# Patient Record
Sex: FEMALE | Race: WHITE | URBAN - METROPOLITAN AREA
[De-identification: names, ages, dates, MRNs, and addresses within clinical notes are randomized per-mention and may not be internally consistent; named-entity substitution may affect disease eponyms.]

---

## 2017-02-23 ENCOUNTER — OUTPATIENT (OUTPATIENT)
Dept: OUTPATIENT SERVICES | Facility: HOSPITAL | Age: 82
LOS: 1 days | Discharge: HOME | End: 2017-02-23

## 2017-06-27 DIAGNOSIS — E03.9 HYPOTHYROIDISM, UNSPECIFIED: ICD-10-CM

## 2017-06-27 DIAGNOSIS — E78.5 HYPERLIPIDEMIA, UNSPECIFIED: ICD-10-CM

## 2017-06-27 DIAGNOSIS — N18.3 CHRONIC KIDNEY DISEASE, STAGE 3 (MODERATE): ICD-10-CM

## 2017-06-27 DIAGNOSIS — R73.01 IMPAIRED FASTING GLUCOSE: ICD-10-CM

## 2017-06-27 DIAGNOSIS — I10 ESSENTIAL (PRIMARY) HYPERTENSION: ICD-10-CM

## 2017-08-21 ENCOUNTER — OUTPATIENT (OUTPATIENT)
Dept: OUTPATIENT SERVICES | Facility: HOSPITAL | Age: 82
LOS: 1 days | Discharge: HOME | End: 2017-08-21

## 2017-08-21 DIAGNOSIS — I25.10 ATHEROSCLEROTIC HEART DISEASE OF NATIVE CORONARY ARTERY WITHOUT ANGINA PECTORIS: ICD-10-CM

## 2017-08-21 DIAGNOSIS — K21.9 GASTRO-ESOPHAGEAL REFLUX DISEASE WITHOUT ESOPHAGITIS: ICD-10-CM

## 2017-08-21 DIAGNOSIS — R73.03 PREDIABETES: ICD-10-CM

## 2017-08-21 DIAGNOSIS — Z98.61 CORONARY ANGIOPLASTY STATUS: ICD-10-CM

## 2017-08-21 DIAGNOSIS — E78.5 HYPERLIPIDEMIA, UNSPECIFIED: ICD-10-CM

## 2017-08-21 DIAGNOSIS — I10 ESSENTIAL (PRIMARY) HYPERTENSION: ICD-10-CM

## 2017-08-21 DIAGNOSIS — E03.9 HYPOTHYROIDISM, UNSPECIFIED: ICD-10-CM

## 2018-02-22 ENCOUNTER — OUTPATIENT (OUTPATIENT)
Dept: OUTPATIENT SERVICES | Facility: HOSPITAL | Age: 83
LOS: 1 days | Discharge: HOME | End: 2018-02-22

## 2018-02-22 DIAGNOSIS — N18.3 CHRONIC KIDNEY DISEASE, STAGE 3 (MODERATE): ICD-10-CM

## 2018-02-22 DIAGNOSIS — R73.01 IMPAIRED FASTING GLUCOSE: ICD-10-CM

## 2018-02-22 DIAGNOSIS — E78.5 HYPERLIPIDEMIA, UNSPECIFIED: ICD-10-CM

## 2018-02-22 DIAGNOSIS — I10 ESSENTIAL (PRIMARY) HYPERTENSION: ICD-10-CM

## 2018-02-22 DIAGNOSIS — M81.0 AGE-RELATED OSTEOPOROSIS WITHOUT CURRENT PATHOLOGICAL FRACTURE: ICD-10-CM

## 2018-02-22 DIAGNOSIS — E03.9 HYPOTHYROIDISM, UNSPECIFIED: ICD-10-CM

## 2018-06-21 ENCOUNTER — TRANSCRIPTION ENCOUNTER (OUTPATIENT)
Age: 83
End: 2018-06-21

## 2018-07-17 ENCOUNTER — TRANSCRIPTION ENCOUNTER (OUTPATIENT)
Age: 83
End: 2018-07-17

## 2018-08-20 ENCOUNTER — OUTPATIENT (OUTPATIENT)
Dept: OUTPATIENT SERVICES | Facility: HOSPITAL | Age: 83
LOS: 1 days | Discharge: HOME | End: 2018-08-20

## 2018-08-20 DIAGNOSIS — R73.01 IMPAIRED FASTING GLUCOSE: ICD-10-CM

## 2018-08-20 DIAGNOSIS — N18.3 CHRONIC KIDNEY DISEASE, STAGE 3 (MODERATE): ICD-10-CM

## 2018-08-20 DIAGNOSIS — I10 ESSENTIAL (PRIMARY) HYPERTENSION: ICD-10-CM

## 2018-08-20 DIAGNOSIS — E03.9 HYPOTHYROIDISM, UNSPECIFIED: ICD-10-CM

## 2018-08-20 DIAGNOSIS — E78.5 HYPERLIPIDEMIA, UNSPECIFIED: ICD-10-CM

## 2018-10-30 ENCOUNTER — OUTPATIENT (OUTPATIENT)
Dept: OUTPATIENT SERVICES | Facility: HOSPITAL | Age: 83
LOS: 1 days | Discharge: HOME | End: 2018-10-30

## 2018-10-30 DIAGNOSIS — R53.1 WEAKNESS: ICD-10-CM

## 2019-02-08 ENCOUNTER — TRANSCRIPTION ENCOUNTER (OUTPATIENT)
Age: 84
End: 2019-02-08

## 2019-02-25 ENCOUNTER — OUTPATIENT (OUTPATIENT)
Dept: OUTPATIENT SERVICES | Facility: HOSPITAL | Age: 84
LOS: 1 days | Discharge: HOME | End: 2019-02-25

## 2019-02-25 DIAGNOSIS — B82.9 INTESTINAL PARASITISM, UNSPECIFIED: ICD-10-CM

## 2019-02-25 DIAGNOSIS — A04.72 ENTEROCOLITIS DUE TO CLOSTRIDIUM DIFFICILE, NOT SPECIFIED AS RECURRENT: ICD-10-CM

## 2019-03-19 ENCOUNTER — OUTPATIENT (OUTPATIENT)
Dept: OUTPATIENT SERVICES | Facility: HOSPITAL | Age: 84
LOS: 1 days | Discharge: HOME | End: 2019-03-19

## 2019-03-19 DIAGNOSIS — R79.82 ELEVATED C-REACTIVE PROTEIN (CRP): ICD-10-CM

## 2019-03-19 DIAGNOSIS — R68.89 OTHER GENERAL SYMPTOMS AND SIGNS: ICD-10-CM

## 2019-03-19 DIAGNOSIS — R79.89 OTHER SPECIFIED ABNORMAL FINDINGS OF BLOOD CHEMISTRY: ICD-10-CM

## 2019-03-19 DIAGNOSIS — K51.918 ULCERATIVE COLITIS, UNSPECIFIED WITH OTHER COMPLICATION: ICD-10-CM

## 2019-04-25 ENCOUNTER — OUTPATIENT (OUTPATIENT)
Dept: OUTPATIENT SERVICES | Facility: HOSPITAL | Age: 84
LOS: 1 days | Discharge: HOME | End: 2019-04-25

## 2019-04-25 DIAGNOSIS — K21.9 GASTRO-ESOPHAGEAL REFLUX DISEASE WITHOUT ESOPHAGITIS: ICD-10-CM

## 2019-04-25 DIAGNOSIS — I10 ESSENTIAL (PRIMARY) HYPERTENSION: ICD-10-CM

## 2019-04-25 DIAGNOSIS — E55.9 VITAMIN D DEFICIENCY, UNSPECIFIED: ICD-10-CM

## 2019-04-25 DIAGNOSIS — R15.9 FULL INCONTINENCE OF FECES: ICD-10-CM

## 2019-04-25 DIAGNOSIS — E03.9 HYPOTHYROIDISM, UNSPECIFIED: ICD-10-CM

## 2019-04-25 DIAGNOSIS — N18.3 CHRONIC KIDNEY DISEASE, STAGE 3 (MODERATE): ICD-10-CM

## 2019-04-25 DIAGNOSIS — E78.5 HYPERLIPIDEMIA, UNSPECIFIED: ICD-10-CM

## 2019-04-25 DIAGNOSIS — R31.29 OTHER MICROSCOPIC HEMATURIA: ICD-10-CM

## 2019-04-25 DIAGNOSIS — J18.9 PNEUMONIA, UNSPECIFIED ORGANISM: ICD-10-CM

## 2019-04-25 DIAGNOSIS — M81.0 AGE-RELATED OSTEOPOROSIS WITHOUT CURRENT PATHOLOGICAL FRACTURE: ICD-10-CM

## 2019-04-25 DIAGNOSIS — I25.10 ATHEROSCLEROTIC HEART DISEASE OF NATIVE CORONARY ARTERY WITHOUT ANGINA PECTORIS: ICD-10-CM

## 2019-04-25 DIAGNOSIS — I65.23 OCCLUSION AND STENOSIS OF BILATERAL CAROTID ARTERIES: ICD-10-CM

## 2019-04-25 DIAGNOSIS — R73.01 IMPAIRED FASTING GLUCOSE: ICD-10-CM

## 2019-05-03 ENCOUNTER — OUTPATIENT (OUTPATIENT)
Dept: OUTPATIENT SERVICES | Facility: HOSPITAL | Age: 84
LOS: 1 days | Discharge: HOME | End: 2019-05-03

## 2019-05-03 DIAGNOSIS — I50.9 HEART FAILURE, UNSPECIFIED: ICD-10-CM

## 2019-05-03 DIAGNOSIS — I48.91 UNSPECIFIED ATRIAL FIBRILLATION: ICD-10-CM

## 2019-05-09 ENCOUNTER — OUTPATIENT (OUTPATIENT)
Dept: OUTPATIENT SERVICES | Facility: HOSPITAL | Age: 84
LOS: 1 days | Discharge: HOME | End: 2019-05-09

## 2019-05-09 DIAGNOSIS — D64.9 ANEMIA, UNSPECIFIED: ICD-10-CM

## 2019-05-13 ENCOUNTER — OUTPATIENT (OUTPATIENT)
Dept: OUTPATIENT SERVICES | Facility: HOSPITAL | Age: 84
LOS: 1 days | Discharge: HOME | End: 2019-05-13
Payer: MEDICARE

## 2019-05-13 DIAGNOSIS — R10.30 LOWER ABDOMINAL PAIN, UNSPECIFIED: ICD-10-CM

## 2019-05-13 PROCEDURE — 74177 CT ABD & PELVIS W/CONTRAST: CPT | Mod: 26

## 2019-09-04 ENCOUNTER — OUTPATIENT (OUTPATIENT)
Dept: OUTPATIENT SERVICES | Facility: HOSPITAL | Age: 84
LOS: 1 days | Discharge: HOME | End: 2019-09-04

## 2019-09-04 DIAGNOSIS — K62.1 RECTAL POLYP: ICD-10-CM

## 2019-12-12 ENCOUNTER — TRANSCRIPTION ENCOUNTER (OUTPATIENT)
Age: 84
End: 2019-12-12

## 2020-08-15 ENCOUNTER — TRANSCRIPTION ENCOUNTER (OUTPATIENT)
Age: 85
End: 2020-08-15

## 2021-04-19 ENCOUNTER — OUTPATIENT (OUTPATIENT)
Dept: OUTPATIENT SERVICES | Facility: HOSPITAL | Age: 86
LOS: 1 days | Discharge: HOME | End: 2021-04-19

## 2021-04-19 ENCOUNTER — OUTPATIENT (OUTPATIENT)
Dept: OUTPATIENT SERVICES | Facility: HOSPITAL | Age: 86
LOS: 1 days | Discharge: HOME | End: 2021-04-19
Payer: MEDICARE

## 2021-04-19 DIAGNOSIS — R91.8 OTHER NONSPECIFIC ABNORMAL FINDING OF LUNG FIELD: ICD-10-CM

## 2021-04-19 DIAGNOSIS — Z11.59 ENCOUNTER FOR SCREENING FOR OTHER VIRAL DISEASES: ICD-10-CM

## 2021-04-19 PROCEDURE — 71046 X-RAY EXAM CHEST 2 VIEWS: CPT | Mod: 26

## 2021-04-22 ENCOUNTER — OUTPATIENT (OUTPATIENT)
Dept: OUTPATIENT SERVICES | Facility: HOSPITAL | Age: 86
LOS: 1 days | Discharge: HOME | End: 2021-04-22
Payer: MEDICARE

## 2021-04-22 DIAGNOSIS — R07.9 CHEST PAIN, UNSPECIFIED: ICD-10-CM

## 2021-04-22 PROCEDURE — 93018 CV STRESS TEST I&R ONLY: CPT

## 2021-04-22 PROCEDURE — 93016 CV STRESS TEST SUPVJ ONLY: CPT

## 2021-04-22 PROCEDURE — 93306 TTE W/DOPPLER COMPLETE: CPT | Mod: 26

## 2021-04-22 PROCEDURE — G1004: CPT

## 2021-04-22 PROCEDURE — 78452 HT MUSCLE IMAGE SPECT MULT: CPT | Mod: 26,ME

## 2021-04-22 RX ORDER — ADENOSINE 3 MG/ML
60 INJECTION INTRAVENOUS ONCE
Refills: 0 | Status: DISCONTINUED | OUTPATIENT
Start: 2021-04-22 | End: 2021-05-06

## 2023-06-25 ENCOUNTER — INPATIENT (INPATIENT)
Facility: HOSPITAL | Age: 88
LOS: 1 days | Discharge: HOME | DRG: 184 | End: 2023-06-27
Attending: SURGERY | Admitting: SURGERY
Payer: MEDICARE

## 2023-06-25 VITALS
OXYGEN SATURATION: 100 % | TEMPERATURE: 99 F | RESPIRATION RATE: 18 BRPM | WEIGHT: 149.91 LBS | HEART RATE: 77 BPM | SYSTOLIC BLOOD PRESSURE: 149 MMHG | DIASTOLIC BLOOD PRESSURE: 78 MMHG

## 2023-06-25 DIAGNOSIS — S22.42XA MULTIPLE FRACTURES OF RIBS, LEFT SIDE, INITIAL ENCOUNTER FOR CLOSED FRACTURE: ICD-10-CM

## 2023-06-25 LAB
ALBUMIN SERPL ELPH-MCNC: 4.6 G/DL — SIGNIFICANT CHANGE UP (ref 3.5–5.2)
ALP SERPL-CCNC: 70 U/L — SIGNIFICANT CHANGE UP (ref 30–115)
ALT FLD-CCNC: 22 U/L — SIGNIFICANT CHANGE UP (ref 0–41)
ANION GAP SERPL CALC-SCNC: 14 MMOL/L — SIGNIFICANT CHANGE UP (ref 7–14)
ANION GAP SERPL CALC-SCNC: 16 MMOL/L — HIGH (ref 7–14)
APTT BLD: 28.1 SEC — SIGNIFICANT CHANGE UP (ref 27–39.2)
AST SERPL-CCNC: 25 U/L — SIGNIFICANT CHANGE UP (ref 0–41)
BASOPHILS # BLD AUTO: 0.02 K/UL — SIGNIFICANT CHANGE UP (ref 0–0.2)
BASOPHILS # BLD AUTO: 0.02 K/UL — SIGNIFICANT CHANGE UP (ref 0–0.2)
BASOPHILS NFR BLD AUTO: 0.2 % — SIGNIFICANT CHANGE UP (ref 0–1)
BASOPHILS NFR BLD AUTO: 0.3 % — SIGNIFICANT CHANGE UP (ref 0–1)
BILIRUB SERPL-MCNC: 0.8 MG/DL — SIGNIFICANT CHANGE UP (ref 0.2–1.2)
BUN SERPL-MCNC: 19 MG/DL — SIGNIFICANT CHANGE UP (ref 10–20)
BUN SERPL-MCNC: 21 MG/DL — HIGH (ref 10–20)
CALCIUM SERPL-MCNC: 9.2 MG/DL — SIGNIFICANT CHANGE UP (ref 8.4–10.5)
CALCIUM SERPL-MCNC: 9.7 MG/DL — SIGNIFICANT CHANGE UP (ref 8.4–10.5)
CHLORIDE SERPL-SCNC: 104 MMOL/L — SIGNIFICANT CHANGE UP (ref 98–110)
CHLORIDE SERPL-SCNC: 105 MMOL/L — SIGNIFICANT CHANGE UP (ref 98–110)
CO2 SERPL-SCNC: 19 MMOL/L — SIGNIFICANT CHANGE UP (ref 17–32)
CO2 SERPL-SCNC: 23 MMOL/L — SIGNIFICANT CHANGE UP (ref 17–32)
CREAT SERPL-MCNC: 1 MG/DL — SIGNIFICANT CHANGE UP (ref 0.7–1.5)
CREAT SERPL-MCNC: 1.1 MG/DL — SIGNIFICANT CHANGE UP (ref 0.7–1.5)
EGFR: 48 ML/MIN/1.73M2 — LOW
EGFR: 54 ML/MIN/1.73M2 — LOW
EOSINOPHIL # BLD AUTO: 0.03 K/UL — SIGNIFICANT CHANGE UP (ref 0–0.7)
EOSINOPHIL # BLD AUTO: 0.17 K/UL — SIGNIFICANT CHANGE UP (ref 0–0.7)
EOSINOPHIL NFR BLD AUTO: 0.3 % — SIGNIFICANT CHANGE UP (ref 0–8)
EOSINOPHIL NFR BLD AUTO: 2.7 % — SIGNIFICANT CHANGE UP (ref 0–8)
GLUCOSE SERPL-MCNC: 115 MG/DL — HIGH (ref 70–99)
GLUCOSE SERPL-MCNC: 121 MG/DL — HIGH (ref 70–99)
HCT VFR BLD CALC: 37.6 % — SIGNIFICANT CHANGE UP (ref 37–47)
HCT VFR BLD CALC: 43.5 % — SIGNIFICANT CHANGE UP (ref 37–47)
HGB BLD-MCNC: 12.5 G/DL — SIGNIFICANT CHANGE UP (ref 12–16)
HGB BLD-MCNC: 13.9 G/DL — SIGNIFICANT CHANGE UP (ref 12–16)
IMM GRANULOCYTES NFR BLD AUTO: 0.3 % — SIGNIFICANT CHANGE UP (ref 0.1–0.3)
IMM GRANULOCYTES NFR BLD AUTO: 0.6 % — HIGH (ref 0.1–0.3)
INR BLD: 0.91 RATIO — SIGNIFICANT CHANGE UP (ref 0.65–1.3)
LACTATE SERPL-SCNC: 2 MMOL/L — SIGNIFICANT CHANGE UP (ref 0.7–2)
LIDOCAIN IGE QN: 49 U/L — SIGNIFICANT CHANGE UP (ref 7–60)
LYMPHOCYTES # BLD AUTO: 1.16 K/UL — LOW (ref 1.2–3.4)
LYMPHOCYTES # BLD AUTO: 1.39 K/UL — SIGNIFICANT CHANGE UP (ref 1.2–3.4)
LYMPHOCYTES # BLD AUTO: 15.3 % — LOW (ref 20.5–51.1)
LYMPHOCYTES # BLD AUTO: 18.4 % — LOW (ref 20.5–51.1)
MAGNESIUM SERPL-MCNC: 1.8 MG/DL — SIGNIFICANT CHANGE UP (ref 1.8–2.4)
MCHC RBC-ENTMCNC: 29.3 PG — SIGNIFICANT CHANGE UP (ref 27–31)
MCHC RBC-ENTMCNC: 29.9 PG — SIGNIFICANT CHANGE UP (ref 27–31)
MCHC RBC-ENTMCNC: 32 G/DL — SIGNIFICANT CHANGE UP (ref 32–37)
MCHC RBC-ENTMCNC: 33.2 G/DL — SIGNIFICANT CHANGE UP (ref 32–37)
MCV RBC AUTO: 90 FL — SIGNIFICANT CHANGE UP (ref 81–99)
MCV RBC AUTO: 91.6 FL — SIGNIFICANT CHANGE UP (ref 81–99)
MONOCYTES # BLD AUTO: 0.45 K/UL — SIGNIFICANT CHANGE UP (ref 0.1–0.6)
MONOCYTES # BLD AUTO: 0.76 K/UL — HIGH (ref 0.1–0.6)
MONOCYTES NFR BLD AUTO: 7.2 % — SIGNIFICANT CHANGE UP (ref 1.7–9.3)
MONOCYTES NFR BLD AUTO: 8.4 % — SIGNIFICANT CHANGE UP (ref 1.7–9.3)
NEUTROPHILS # BLD AUTO: 4.45 K/UL — SIGNIFICANT CHANGE UP (ref 1.4–6.5)
NEUTROPHILS # BLD AUTO: 6.87 K/UL — HIGH (ref 1.4–6.5)
NEUTROPHILS NFR BLD AUTO: 70.8 % — SIGNIFICANT CHANGE UP (ref 42.2–75.2)
NEUTROPHILS NFR BLD AUTO: 75.5 % — HIGH (ref 42.2–75.2)
NRBC # BLD: 0 /100 WBCS — SIGNIFICANT CHANGE UP (ref 0–0)
NRBC # BLD: 0 /100 WBCS — SIGNIFICANT CHANGE UP (ref 0–0)
PHOSPHATE SERPL-MCNC: 4 MG/DL — SIGNIFICANT CHANGE UP (ref 2.1–4.9)
PLATELET # BLD AUTO: 218 K/UL — SIGNIFICANT CHANGE UP (ref 130–400)
PLATELET # BLD AUTO: 259 K/UL — SIGNIFICANT CHANGE UP (ref 130–400)
PMV BLD: 10 FL — SIGNIFICANT CHANGE UP (ref 7.4–10.4)
PMV BLD: 9.9 FL — SIGNIFICANT CHANGE UP (ref 7.4–10.4)
POTASSIUM SERPL-MCNC: 4.7 MMOL/L — SIGNIFICANT CHANGE UP (ref 3.5–5)
POTASSIUM SERPL-MCNC: 4.9 MMOL/L — SIGNIFICANT CHANGE UP (ref 3.5–5)
POTASSIUM SERPL-SCNC: 4.7 MMOL/L — SIGNIFICANT CHANGE UP (ref 3.5–5)
POTASSIUM SERPL-SCNC: 4.9 MMOL/L — SIGNIFICANT CHANGE UP (ref 3.5–5)
PROT SERPL-MCNC: 7.5 G/DL — SIGNIFICANT CHANGE UP (ref 6–8)
PROTHROM AB SERPL-ACNC: 10.3 SEC — SIGNIFICANT CHANGE UP (ref 9.95–12.87)
RBC # BLD: 4.18 M/UL — LOW (ref 4.2–5.4)
RBC # BLD: 4.75 M/UL — SIGNIFICANT CHANGE UP (ref 4.2–5.4)
RBC # FLD: 15.3 % — HIGH (ref 11.5–14.5)
RBC # FLD: 15.4 % — HIGH (ref 11.5–14.5)
SODIUM SERPL-SCNC: 139 MMOL/L — SIGNIFICANT CHANGE UP (ref 135–146)
SODIUM SERPL-SCNC: 142 MMOL/L — SIGNIFICANT CHANGE UP (ref 135–146)
WBC # BLD: 6.29 K/UL — SIGNIFICANT CHANGE UP (ref 4.8–10.8)
WBC # BLD: 9.1 K/UL — SIGNIFICANT CHANGE UP (ref 4.8–10.8)
WBC # FLD AUTO: 6.29 K/UL — SIGNIFICANT CHANGE UP (ref 4.8–10.8)
WBC # FLD AUTO: 9.1 K/UL — SIGNIFICANT CHANGE UP (ref 4.8–10.8)

## 2023-06-25 PROCEDURE — 73560 X-RAY EXAM OF KNEE 1 OR 2: CPT | Mod: 26,LT

## 2023-06-25 PROCEDURE — 99285 EMERGENCY DEPT VISIT HI MDM: CPT

## 2023-06-25 PROCEDURE — 97116 GAIT TRAINING THERAPY: CPT | Mod: GP

## 2023-06-25 PROCEDURE — 84100 ASSAY OF PHOSPHORUS: CPT

## 2023-06-25 PROCEDURE — 97110 THERAPEUTIC EXERCISES: CPT | Mod: GP

## 2023-06-25 PROCEDURE — 73030 X-RAY EXAM OF SHOULDER: CPT | Mod: 26,LT

## 2023-06-25 PROCEDURE — 36415 COLL VENOUS BLD VENIPUNCTURE: CPT

## 2023-06-25 PROCEDURE — 81003 URINALYSIS AUTO W/O SCOPE: CPT

## 2023-06-25 PROCEDURE — 74177 CT ABD & PELVIS W/CONTRAST: CPT | Mod: 26,MA

## 2023-06-25 PROCEDURE — 71260 CT THORAX DX C+: CPT | Mod: 26,MA

## 2023-06-25 PROCEDURE — 70450 CT HEAD/BRAIN W/O DYE: CPT | Mod: 26,MA

## 2023-06-25 PROCEDURE — 71045 X-RAY EXAM CHEST 1 VIEW: CPT | Mod: 26

## 2023-06-25 PROCEDURE — 85025 COMPLETE CBC W/AUTO DIFF WBC: CPT

## 2023-06-25 PROCEDURE — 99223 1ST HOSP IP/OBS HIGH 75: CPT

## 2023-06-25 PROCEDURE — 73070 X-RAY EXAM OF ELBOW: CPT | Mod: 26,LT

## 2023-06-25 PROCEDURE — 72170 X-RAY EXAM OF PELVIS: CPT | Mod: 26

## 2023-06-25 PROCEDURE — 83735 ASSAY OF MAGNESIUM: CPT

## 2023-06-25 PROCEDURE — 80048 BASIC METABOLIC PNL TOTAL CA: CPT

## 2023-06-25 PROCEDURE — 72125 CT NECK SPINE W/O DYE: CPT | Mod: 26,MA

## 2023-06-25 PROCEDURE — 97162 PT EVAL MOD COMPLEX 30 MIN: CPT | Mod: GP

## 2023-06-25 PROCEDURE — 71045 X-RAY EXAM CHEST 1 VIEW: CPT

## 2023-06-25 PROCEDURE — 73060 X-RAY EXAM OF HUMERUS: CPT | Mod: 26,LT

## 2023-06-25 RX ORDER — HYDROMORPHONE HYDROCHLORIDE 2 MG/ML
0.25 INJECTION INTRAMUSCULAR; INTRAVENOUS; SUBCUTANEOUS EVERY 4 HOURS
Refills: 0 | Status: DISCONTINUED | OUTPATIENT
Start: 2023-06-25 | End: 2023-06-27

## 2023-06-25 RX ORDER — SENNA PLUS 8.6 MG/1
2 TABLET ORAL AT BEDTIME
Refills: 0 | Status: DISCONTINUED | OUTPATIENT
Start: 2023-06-25 | End: 2023-06-27

## 2023-06-25 RX ORDER — METOPROLOL TARTRATE 50 MG
50 TABLET ORAL
Refills: 0 | Status: DISCONTINUED | OUTPATIENT
Start: 2023-06-25 | End: 2023-06-27

## 2023-06-25 RX ORDER — AMLODIPINE BESYLATE 2.5 MG/1
1 TABLET ORAL
Refills: 0 | DISCHARGE

## 2023-06-25 RX ORDER — MORPHINE SULFATE 50 MG/1
4 CAPSULE, EXTENDED RELEASE ORAL ONCE
Refills: 0 | Status: DISCONTINUED | OUTPATIENT
Start: 2023-06-25 | End: 2023-06-25

## 2023-06-25 RX ORDER — ACETAMINOPHEN 500 MG
650 TABLET ORAL EVERY 6 HOURS
Refills: 0 | Status: DISCONTINUED | OUTPATIENT
Start: 2023-06-25 | End: 2023-06-27

## 2023-06-25 RX ORDER — HEPARIN SODIUM 5000 [USP'U]/ML
5000 INJECTION INTRAVENOUS; SUBCUTANEOUS EVERY 8 HOURS
Refills: 0 | Status: DISCONTINUED | OUTPATIENT
Start: 2023-06-25 | End: 2023-06-27

## 2023-06-25 RX ORDER — LEVOTHYROXINE SODIUM 125 MCG
100 TABLET ORAL DAILY
Refills: 0 | Status: DISCONTINUED | OUTPATIENT
Start: 2023-06-25 | End: 2023-06-27

## 2023-06-25 RX ORDER — ASPIRIN/CALCIUM CARB/MAGNESIUM 324 MG
81 TABLET ORAL DAILY
Refills: 0 | Status: DISCONTINUED | OUTPATIENT
Start: 2023-06-25 | End: 2023-06-27

## 2023-06-25 RX ORDER — OMEPRAZOLE 10 MG/1
1 CAPSULE, DELAYED RELEASE ORAL
Refills: 0 | DISCHARGE

## 2023-06-25 RX ORDER — SIMVASTATIN 20 MG/1
20 TABLET, FILM COATED ORAL AT BEDTIME
Refills: 0 | Status: DISCONTINUED | OUTPATIENT
Start: 2023-06-25 | End: 2023-06-27

## 2023-06-25 RX ORDER — SIMVASTATIN 20 MG/1
1 TABLET, FILM COATED ORAL
Refills: 0 | DISCHARGE

## 2023-06-25 RX ORDER — METOPROLOL TARTRATE 50 MG
1 TABLET ORAL
Refills: 0 | DISCHARGE

## 2023-06-25 RX ORDER — LIDOCAINE 4 G/100G
1 CREAM TOPICAL ONCE
Refills: 0 | Status: COMPLETED | OUTPATIENT
Start: 2023-06-25 | End: 2023-06-25

## 2023-06-25 RX ORDER — AMLODIPINE BESYLATE 2.5 MG/1
10 TABLET ORAL ONCE
Refills: 0 | Status: COMPLETED | OUTPATIENT
Start: 2023-06-25 | End: 2023-06-25

## 2023-06-25 RX ORDER — LATANOPROST 0.05 MG/ML
1 SOLUTION/ DROPS OPHTHALMIC; TOPICAL AT BEDTIME
Refills: 0 | Status: DISCONTINUED | OUTPATIENT
Start: 2023-06-25 | End: 2023-06-27

## 2023-06-25 RX ORDER — OXYCODONE HYDROCHLORIDE 5 MG/1
5 TABLET ORAL EVERY 6 HOURS
Refills: 0 | Status: DISCONTINUED | OUTPATIENT
Start: 2023-06-25 | End: 2023-06-27

## 2023-06-25 RX ORDER — TIMOLOL 0.5 %
1 DROPS OPHTHALMIC (EYE) ONCE
Refills: 0 | Status: COMPLETED | OUTPATIENT
Start: 2023-06-25 | End: 2023-06-25

## 2023-06-25 RX ORDER — ASPIRIN/CALCIUM CARB/MAGNESIUM 324 MG
1 TABLET ORAL
Refills: 0 | DISCHARGE

## 2023-06-25 RX ORDER — ACETAMINOPHEN 500 MG
650 TABLET ORAL ONCE
Refills: 0 | Status: COMPLETED | OUTPATIENT
Start: 2023-06-25 | End: 2023-06-25

## 2023-06-25 RX ORDER — TIMOLOL 0.5 %
1 DROPS OPHTHALMIC (EYE)
Refills: 0 | DISCHARGE

## 2023-06-25 RX ORDER — LATANOPROST 0.05 MG/ML
1 SOLUTION/ DROPS OPHTHALMIC; TOPICAL
Refills: 0 | DISCHARGE

## 2023-06-25 RX ORDER — GABAPENTIN 400 MG/1
100 CAPSULE ORAL ONCE
Refills: 0 | Status: COMPLETED | OUTPATIENT
Start: 2023-06-25 | End: 2023-06-25

## 2023-06-25 RX ORDER — PANTOPRAZOLE SODIUM 20 MG/1
40 TABLET, DELAYED RELEASE ORAL
Refills: 0 | Status: DISCONTINUED | OUTPATIENT
Start: 2023-06-25 | End: 2023-06-27

## 2023-06-25 RX ORDER — MORPHINE SULFATE 50 MG/1
2 CAPSULE, EXTENDED RELEASE ORAL ONCE
Refills: 0 | Status: DISCONTINUED | OUTPATIENT
Start: 2023-06-25 | End: 2023-06-25

## 2023-06-25 RX ORDER — LEVOTHYROXINE SODIUM 125 MCG
1 TABLET ORAL
Refills: 0 | DISCHARGE

## 2023-06-25 RX ADMIN — LATANOPROST 1 DROP(S): 0.05 SOLUTION/ DROPS OPHTHALMIC; TOPICAL at 16:40

## 2023-06-25 RX ADMIN — Medication 1 DROP(S): at 16:41

## 2023-06-25 RX ADMIN — MORPHINE SULFATE 4 MILLIGRAM(S): 50 CAPSULE, EXTENDED RELEASE ORAL at 14:52

## 2023-06-25 RX ADMIN — SENNA PLUS 2 TABLET(S): 8.6 TABLET ORAL at 16:41

## 2023-06-25 RX ADMIN — GABAPENTIN 100 MILLIGRAM(S): 400 CAPSULE ORAL at 16:40

## 2023-06-25 RX ADMIN — LIDOCAINE 1 PATCH: 4 CREAM TOPICAL at 16:39

## 2023-06-25 RX ADMIN — Medication 1 TABLET(S): at 16:41

## 2023-06-25 RX ADMIN — Medication 650 MILLIGRAM(S): at 13:20

## 2023-06-25 RX ADMIN — HYDROMORPHONE HYDROCHLORIDE 0.25 MILLIGRAM(S): 2 INJECTION INTRAMUSCULAR; INTRAVENOUS; SUBCUTANEOUS at 21:50

## 2023-06-25 RX ADMIN — AMLODIPINE BESYLATE 10 MILLIGRAM(S): 2.5 TABLET ORAL at 16:41

## 2023-06-25 RX ADMIN — SIMVASTATIN 20 MILLIGRAM(S): 20 TABLET, FILM COATED ORAL at 16:42

## 2023-06-25 RX ADMIN — HYDROMORPHONE HYDROCHLORIDE 0.25 MILLIGRAM(S): 2 INJECTION INTRAMUSCULAR; INTRAVENOUS; SUBCUTANEOUS at 21:22

## 2023-06-25 RX ADMIN — MORPHINE SULFATE 2 MILLIGRAM(S): 50 CAPSULE, EXTENDED RELEASE ORAL at 12:44

## 2023-06-25 RX ADMIN — HEPARIN SODIUM 5000 UNIT(S): 5000 INJECTION INTRAVENOUS; SUBCUTANEOUS at 21:22

## 2023-06-25 RX ADMIN — Medication 81 MILLIGRAM(S): at 16:41

## 2023-06-25 RX ADMIN — Medication 50 MILLIGRAM(S): at 19:02

## 2023-06-25 RX ADMIN — HYDROMORPHONE HYDROCHLORIDE 0.25 MILLIGRAM(S): 2 INJECTION INTRAMUSCULAR; INTRAVENOUS; SUBCUTANEOUS at 17:16

## 2023-06-25 RX ADMIN — HEPARIN SODIUM 5000 UNIT(S): 5000 INJECTION INTRAVENOUS; SUBCUTANEOUS at 16:38

## 2023-06-25 NOTE — CONSULT NOTE ADULT - ASSESSMENT
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 she was seen in acute care in the ED and Trauma consult was called. Patient notes she was walking outside and tripped over a crack in the concrete. She fell striking the left side of her body, chest, left face, and left knee. She had no LOC. She notes pain in left arm and shoulder with decreased mobility of that extremity. Patient without additional complaints. Patient had imaging revealing of  Left Humeral neck fracture along with fracture of left 3rd, 4th, 5th, 6ht, 7th, 8th ribs.     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 she was seen in acute care in the ED and Trauma consult was called. Patient notes she was walking outside and tripped over a crack in the concrete. She fell striking the left side of her body, chest, left face, and left knee. She had no LOC. She notes pain in left arm and shoulder with decreased mobility of that extremity. Patient without additional complaints. Patient had imaging revealing of  Left Humeral neck fracture along with fracture of left 3rd, 4th, 5th, 6ht, 7th, 8th ribs.     S/P Fall with multiple rib fractures of left chest and left Humeral neck fracture  - Admit to SDU  - IS use  - Multi-modal pain control  - PT/OT/Hospitalist consult

## 2023-06-25 NOTE — H&P ADULT - ATTENDING COMMENTS
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 she was seen in acute care in the ED and Trauma consult was called. Patient notes she was walking outside and tripped over a crack in the concrete. She fell striking the left side of her body, chest, left face, and left knee. She had no LOC. She notes pain in left arm and shoulder with decreased mobility of that extremity.     Primary and secondary surveys were performed.    AAO x3  GCS 15.  Neuro intact.  Neck: no step-offs  Chest: decreased breath sounds in bilateral lung bases, L>R  CV : rrr  Abdomen: soft, nontender  Extr: Left shoulder pain to palpation and motion    All images and labs were reviewed.    ASSESSMENT:  88 y/o female, S/P Fall.  Closed Head Injury.  Closed Left Proximal Humerus Fracture.  Closed Left 3-8th Rib Fractures.  Acute pain due to trauma.  At risk for respiratory complications.    PLAN:  - pain control  - continuous O2sat and hemodynamic monitoring  - incentive spirometer  - follow CXR in am  - keep normotensive  - DVT prophylaxis  Admit to SDU.    This note reflects my exam and care of this patient on 06/25/2023

## 2023-06-25 NOTE — H&P ADULT - ASSESSMENT
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 she was seen in acute care in the ED and Trauma consult was called. Patient notes she was walking outside and tripped over a crack in the concrete. She fell striking the left side of her body, chest, left face, and left knee. She had no LOC. She notes pain in left arm and shoulder with decreased mobility of that extremity. Patient without additional complaints. Patient had imaging revealing of  Left Humeral neck fracture along with fracture of left 3rd, 4th, 5th, 6ht, 7th, 8th ribs.     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 she was seen in acute care in the ED and Trauma consult was called. Patient notes she was walking outside and tripped over a crack in the concrete. She fell striking the left side of her body, chest, left face, and left knee. She had no LOC. She notes pain in left arm and shoulder with decreased mobility of that extremity. Patient without additional complaints. Patient had imaging revealing of  Left Humeral neck fracture along with fracture of left 3rd, 4th, 5th, 6ht, 7th, 8th ribs.     S/P Fall with multiple rib fractures of left chest and left Humeral neck fracture  - Incentive Spirometer use encouraged   - Pain control, Tylenol Ibuprofen/ Oxycontin for pain  - Localized Lidocaine patches  - Orthopedics Consult    - PT/OT/Hospitalist consult  - Continue home medications     Follow up PCP following discharge for incidental findings of b/l pulmonary nodules and adrenal nodule    Discussed with Dr. Bliss, patient, family and ED provider

## 2023-06-25 NOTE — H&P ADULT - NSHPPHYSICALEXAM_GEN_ALL_CORE
Vital Signs Last 24 Hrs  T(C): 37.1 (25 Jun 2023 09:34), Max: 37.1 (25 Jun 2023 09:34)  T(F): 98.7 (25 Jun 2023 09:34), Max: 98.7 (25 Jun 2023 09:34)  HR: 77 (25 Jun 2023 09:34) (77 - 77)  BP: 149/78 (25 Jun 2023 09:34) (149/78 - 149/78)  BP(mean): --  RR: 18 (25 Jun 2023 09:34) (18 - 18)  SpO2: 100% (25 Jun 2023 09:34) (100% - 100%)    Parameters below as of 25 Jun 2023 09:34  Patient On (Oxygen Delivery Method): room air    Physical Exam  Gen: NAD  Head: NC/AT, no visible deformity  ENT: PERRLA, Sclera  , PERRLA Mucosal Membranes  Cardio: S1/S2 No S3/S4, Regular  Resp: CTA B/L  Abdomen: Soft, ND/NT  Neuro: AAOx3   Extremities: FROM x 4  Skin: No jaundice, no excoriation

## 2023-06-25 NOTE — H&P ADULT - NSHPLABSRESULTS_GEN_ALL_CORE
13.9   6.29  )-----------( 259      ( 25 Jun 2023 10:41 )             43.5       Auto Neutrophil %: 70.8 % (06-25-23 @ 10:41)  Auto Immature Granulocyte %: 0.6 % (06-25-23 @ 10:41)    06-25    142  |  105  |  21<H>  ----------------------------<  115<H>  4.7   |  23  |  1.1      Calcium: 9.7 mg/dL (06-25-23 @ 10:41)      LFTs:             7.5  | 0.8  | 25       ------------------[70      ( 25 Jun 2023 10:41 )  4.6  | x    | 22          Lipase:49       Lactate, Blood: 2.0 mmol/L (06-25-23 @ 10:41)      Coags:     10.30  ----< 0.91    ( 25 Jun 2023 10:41 )     28.1        Urinalysis Basic - ( 25 Jun 2023 10:41 )  Color: x / Appearance: x / SG: x / pH: x  Gluc: 115 mg/dL / Ketone: x  / Bili: x / Urobili: x   Blood: x / Protein: x / Nitrite: x   Leuk Esterase: x / RBC: x / WBC x   Sq Epi: x / Non Sq Epi: x / Bacteria: x      RADIOLOGY & ADDITIONAL STUDIES:  Xray Pelvis AP only 06.25.23   IMPRESSION:    Bones are osteopenic. No definite acute displaced fracture or   dislocation. Lumbosacral spine and bilateral hip changes. Excreted   contrast in the urinary bladder.    Xray Elbow AP + Lateral, Left 06.25.23  IMPRESSION:    Proximal left humerus fracture which is impacted and fracture line   appears to be extending obliquely from the surgical neck superiorly   toward the anatomical neck    < from: CT Abdomen and Pelvis w/ IV Cont (06.25.23 @ 11:39) >  IMPRESSION:    Acute comminuted, impacted and angulated LEFT humeral neck fracture. No   evidence of glenohumeral joint dislocation.    Acute minimally displaced LEFT 3rd, 4th, 5th, 6th and 7th  and 8th rib   fractures. No pneumothorax.    Scattered bilateral pulmonary nodules up to 1.1 cm,    Follow-up noncontrast chest CT is recommended when the patient's current   situation improves.    Slight nodularity of the hepatic contour. This may be within normal   limits and is also seen in disease processes such as cirrhosis and   clinical correlation is recommended.    Stable indeterminate right adrenal gland 2.9 cm nodule.    Indeterminate left thyroid nodule measuring up to 3.7 cm. Follow-up with   outpatient ultrasound is recommended ifindicated      Additional attending comments:    Scattered bilateral groundglass and reticular opacities seen throughout   both lung fields    Increase in size nodule opacities in the right middle lobe.    Follow-up noncontrast chest CT is recommended when the patient's current   situation improves.

## 2023-06-25 NOTE — CONSULT NOTE ADULT - SUBJECTIVE AND OBJECTIVE BOX
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 she was seen in acute care in the ED and Trauma consult was called. Patient notes she was walking outside and tripped over a crack in the concrete. She fell striking the left side of her body, chest, left face, and left knee. She had no LOC. She notes pain in left arm and shoulder with decreased mobility of that extremity. Patient without additional complaints.       Allergies  Valium (Rash)  penicillin (Flushing; Rash)  sulfa drugs (Rash)        Review of Systems  General:  Denies Fatigue, Denies Fever, Denies Weakness ,Denies Weight Loss   HEENT: Denies Trouble Swallowing ,Denies  Sore Throat , Denies Change in hearing/vision/speech ,Denies Dizziness    Cardio: Denies  Chest Pain , Palpitations    Respiratory: Denies worsening of SOB, Denies Cough  Abdomen: Denies Abdominal pain, nausea, emesis   Neuro: Denies Headache Denies Dizziness, Denies Paresthesias  MSK: See HPI  Psych: Patient denies depression, denies suicidal or homicidal ideations  Integ: Patient Denies rash, or new skin lesions       Vital Signs Last 24 Hrs  T(C): 37.1 (25 Jun 2023 09:34), Max: 37.1 (25 Jun 2023 09:34)  T(F): 98.7 (25 Jun 2023 09:34), Max: 98.7 (25 Jun 2023 09:34)  HR: 77 (25 Jun 2023 09:34) (77 - 77)  BP: 149/78 (25 Jun 2023 09:34) (149/78 - 149/78)  BP(mean): --  RR: 18 (25 Jun 2023 09:34) (18 - 18)  SpO2: 100% (25 Jun 2023 09:34) (100% - 100%)    Parameters below as of 25 Jun 2023 09:34  Patient On (Oxygen Delivery Method): room air    Physical Exam  Gen: NAD  Head: NC/AT, no visible deformity  ENT: PERRLA, Sclera  , PERRLA Mucosal Membranes  Cardio: S1/S2 No S3/S4, Regular  Resp: CTA B/L  Abdomen: Soft, ND/NT  Neuro: AAOx3   Extremities: FROM x 4  Skin: No jaundice, no excoriation     Labs:                          13.9   6.29  )-----------( 259      ( 25 Jun 2023 10:41 )             43.5       Auto Neutrophil %: 70.8 % (06-25-23 @ 10:41)  Auto Immature Granulocyte %: 0.6 % (06-25-23 @ 10:41)    06-25    142  |  105  |  21<H>  ----------------------------<  115<H>  4.7   |  23  |  1.1      Calcium: 9.7 mg/dL (06-25-23 @ 10:41)      LFTs:             7.5  | 0.8  | 25       ------------------[70      ( 25 Jun 2023 10:41 )  4.6  | x    | 22          Lipase:49       Lactate, Blood: 2.0 mmol/L (06-25-23 @ 10:41)      Coags:     10.30  ----< 0.91    ( 25 Jun 2023 10:41 )     28.1        Urinalysis Basic - ( 25 Jun 2023 10:41 )  Color: x / Appearance: x / SG: x / pH: x  Gluc: 115 mg/dL / Ketone: x  / Bili: x / Urobili: x   Blood: x / Protein: x / Nitrite: x   Leuk Esterase: x / RBC: x / WBC x   Sq Epi: x / Non Sq Epi: x / Bacteria: x      RADIOLOGY & ADDITIONAL STUDIES:  Xray Pelvis AP only 06.25.23   IMPRESSION:    Bones are osteopenic. No definite acute displaced fracture or   dislocation. Lumbosacral spine and bilateral hip changes. Excreted   contrast in the urinary bladder.    Xray Elbow AP + Lateral, Left 06.25.23  IMPRESSION:    Proximal left humerus fracture which is impacted and fracture line   appears to be extending obliquely from the surgical neck superiorly   toward the anatomical neck    < from: CT Abdomen and Pelvis w/ IV Cont (06.25.23 @ 11:39) >  IMPRESSION:    Acute comminuted, impacted and angulated LEFT humeral neck fracture. No   evidence of glenohumeral joint dislocation.    Acute minimally displaced LEFT 3rd, 4th, 5th, 6th and 7th  and 8th rib   fractures. No pneumothorax.    Scattered bilateral pulmonary nodules up to 1.1 cm,    Follow-up noncontrast chest CT is recommended when the patient's current   situation improves.    Slight nodularity of the hepatic contour. This may be within normal   limits and is also seen in disease processes such as cirrhosis and   clinical correlation is recommended.    Stable indeterminate right adrenal gland 2.9 cm nodule.    Indeterminate left thyroid nodule measuring up to 3.7 cm. Follow-up with   outpatient ultrasound is recommended ifindicated      Additional attending comments:    Scattered bilateral groundglass and reticular opacities seen throughout   both lung fields    Increase in size nodule opacities in the right middle lobe.    Follow-up noncontrast chest CT is recommended when the patient's current   situation improves.

## 2023-06-25 NOTE — ED PROVIDER NOTE - PHYSICAL EXAMINATION
Vital Signs: I have reviewed the initial vital signs.  Constitutional: Well-nourished, appears younger than stated age, in no acute distress.  HEENT: Airway patent, moist MM, no erythema/swelling/deformity of oral structures. EOMI, PERRLA.  CV: Regular rate, regular rhythm, well-perfused extremities, 2+ pulses bilaterally. DP/PT and radial pulses equal.  Lungs: CTAB, no increased WOB.  ABD: NTND, no guarding or rebound, no pulsatile masses, no hernias, no flank pain.   MSK: Neck supple, nontender, normal ROM, no stepoff. Chest nontender. Back nontender in TLS spine or to bilateral bony structures. +left shoulder soreness without obvious dislocation or defomrity, no overlying skin changes, ROM limited 2/2 pain. No elbow, forearm, or wrist/hand pain. +superficial abrasion to anterior left knee. Extremities otherwise nontender, normal ROM, no deformities.   INTEG: Skin warm, dry, +left knee abrasion  NEURO: A&Ox3, moving all extremities, normal speech.   PSYCH: Calm, cooperative, normal affect and interaction.

## 2023-06-25 NOTE — H&P ADULT - NSHPREVIEWOFSYSTEMS_GEN_ALL_CORE
Review of Systems  General:  Denies Fatigue, Denies Fever, Denies Weakness ,Denies Weight Loss   HEENT: Denies Trouble Swallowing ,Denies  Sore Throat , Denies Change in hearing/vision/speech ,Denies Dizziness    Cardio: Denies  Chest Pain , Palpitations    Respiratory: Denies worsening of SOB, Denies Cough  Abdomen: Denies Abdominal pain, nausea, emesis   Neuro: Denies Headache Denies Dizziness, Denies Paresthesias  MSK: See HPI  Psych: Patient denies depression, denies suicidal or homicidal ideations  Integ: Patient Denies rash, or new skin lesions

## 2023-06-25 NOTE — ED ADULT TRIAGE NOTE - CHIEF COMPLAINT QUOTE
Patient had single mechanical fall this morning, presents c/o left shoulder pain and left flank pain . Patient denies LOC and blood thinners but did bump head on ground. No obvious deformities noted. As per EMS patient was ambulatory at the scene with assistance

## 2023-06-25 NOTE — ED PROVIDER NOTE - PROGRESS NOTE DETAILS
TJY: trauma currently recommending orthopedics consult. Pt d/w orthopedics, recommending sling, will assess patient. TJY: trauma consulted regarding rib fractures.

## 2023-06-25 NOTE — ED PROVIDER NOTE - OBJECTIVE STATEMENT
88yo female PMHx CAD s/p stents (only ASA 81mg daily), HTN, HLD, hypothyroid, and GERD presenting s/p a trip on sidewalk and falling forward about 1 hour PTA. Pt "grazed" the front of her face on the sidewalk, no LOC. +c/o pain to left shoulder and "soreness" to her left ribs without difficulty breathing. +"soreness" to her left knee wihtout difficulty ambulating. No precipitating or subsequent dizziness/lightheadedness/syncope, numbness/weakness, cp/sob, or n/v, and has otherwise been in normal state of health.

## 2023-06-25 NOTE — ED PROVIDER NOTE - CLINICAL SUMMARY MEDICAL DECISION MAKING FREE TEXT BOX
89-year-old woman, history of CAD status post stents on aspirin, hypertension, hyperlipidemia, hypothyroid, GERD, complains of left shoulder and left chest wall pain status post mechanical fall PTA.  Grazed head on the sidewalk, but no LOC.  Vital signs, exam as noted, patient is very well-appearing, however there is significant tenderness to the left shoulder and left chest wall.  Labs okay, imaging reveals comminuted fracture of the left humerus as well as 6 rib fractures.  Trauma consulted and will admit to stepdown on their service, Ortho aware.  Patient and family understand and are amenable to plan.

## 2023-06-25 NOTE — ED PROVIDER NOTE - CARE PLAN
1 Principal Discharge DX:	Multiple fractures of ribs of left side  Secondary Diagnosis:	Humeral fracture  Secondary Diagnosis:	Fall

## 2023-06-25 NOTE — ED ADULT NURSE NOTE - OBJECTIVE STATEMENT
Pt presents to the ED s/p [place]. [place]. No LOC. No AC use. Pt states she hit the side of her cheek in the fall. She is having pain in the L arm and shoulder.

## 2023-06-25 NOTE — CONSULT NOTE ADULT - SUBJECTIVE AND OBJECTIVE BOX
Orthopedic Consult Note:    HPI:  89 RHD female presents to [facility] ED s/p mechanical fall ground level height. Denies head strike LOC.   Patient full ambulator, independent living. Patient reports taking ASA 81 daily s/p stent placement    PMH: HTN, HLD, HYPOT  PSH: Kidney donor  Allergies: Denies    PE:  Awake, alert  NAD  Breathing on RA    LUE  Skin intact, jewelry and bracelet removed from LUE  ROM limited due to pain   TTP proximal humerus  No TTP humeral shaft, elbow, forearm, hand wrist  Compartments soft and compressible, no pain passive stretch digits  NVI Distally  Cap refill < 2 sec    Imaging:  CT Chest / L shoulder XR demonstrate L proximal humerus fracture  Chest XR- Multiple Rib fractures    AP:  89F L proximal humerus fracture  -Sling placement with encouragement ROM elbow, wrist, hand, fingers  -NWB RUE  -No acute orthopedic intervention at this time  -PT/OT  -Pain control  -Home medications  -VTE prophylaxis per primary  -SCD  -ICS machine - setting of Rib fx  -Patient to follow-up with Dr. Walker [address] [facility]. Please call to make appointment upon discharge

## 2023-06-25 NOTE — H&P ADULT - HISTORY OF PRESENT ILLNESS
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 she was seen in acute care in the ED and Trauma consult was called. Patient notes she was walking outside and tripped over a crack in the concrete. She fell striking the left side of her body, chest, left face, and left knee. She had no LOC. She notes pain in left arm and shoulder with decreased mobility of that extremity. Patient without additional complaints.

## 2023-06-25 NOTE — ED ADULT NURSE NOTE - NSFALLHARMRISKINTERV_ED_ALL_ED

## 2023-06-26 LAB
APPEARANCE UR: CLEAR — SIGNIFICANT CHANGE UP
BASOPHILS # BLD AUTO: 0.02 K/UL — SIGNIFICANT CHANGE UP (ref 0–0.2)
BASOPHILS NFR BLD AUTO: 0.2 % — SIGNIFICANT CHANGE UP (ref 0–1)
BILIRUB UR-MCNC: NEGATIVE — SIGNIFICANT CHANGE UP
COLOR SPEC: YELLOW — SIGNIFICANT CHANGE UP
DIFF PNL FLD: NEGATIVE — SIGNIFICANT CHANGE UP
EOSINOPHIL # BLD AUTO: 0.25 K/UL — SIGNIFICANT CHANGE UP (ref 0–0.7)
EOSINOPHIL NFR BLD AUTO: 3 % — SIGNIFICANT CHANGE UP (ref 0–8)
GLUCOSE UR QL: NEGATIVE — SIGNIFICANT CHANGE UP
HCT VFR BLD CALC: 34.3 % — LOW (ref 37–47)
HGB BLD-MCNC: 11.1 G/DL — LOW (ref 12–16)
IMM GRANULOCYTES NFR BLD AUTO: 0.2 % — SIGNIFICANT CHANGE UP (ref 0.1–0.3)
KETONES UR-MCNC: NEGATIVE — SIGNIFICANT CHANGE UP
LEUKOCYTE ESTERASE UR-ACNC: NEGATIVE — SIGNIFICANT CHANGE UP
LYMPHOCYTES # BLD AUTO: 1.83 K/UL — SIGNIFICANT CHANGE UP (ref 1.2–3.4)
LYMPHOCYTES # BLD AUTO: 22.1 % — SIGNIFICANT CHANGE UP (ref 20.5–51.1)
MCHC RBC-ENTMCNC: 29.7 PG — SIGNIFICANT CHANGE UP (ref 27–31)
MCHC RBC-ENTMCNC: 32.4 G/DL — SIGNIFICANT CHANGE UP (ref 32–37)
MCV RBC AUTO: 91.7 FL — SIGNIFICANT CHANGE UP (ref 81–99)
MONOCYTES # BLD AUTO: 0.81 K/UL — HIGH (ref 0.1–0.6)
MONOCYTES NFR BLD AUTO: 9.8 % — HIGH (ref 1.7–9.3)
NEUTROPHILS # BLD AUTO: 5.36 K/UL — SIGNIFICANT CHANGE UP (ref 1.4–6.5)
NEUTROPHILS NFR BLD AUTO: 64.7 % — SIGNIFICANT CHANGE UP (ref 42.2–75.2)
NITRITE UR-MCNC: NEGATIVE — SIGNIFICANT CHANGE UP
NRBC # BLD: 0 /100 WBCS — SIGNIFICANT CHANGE UP (ref 0–0)
PH UR: 6 — SIGNIFICANT CHANGE UP (ref 5–8)
PLATELET # BLD AUTO: 213 K/UL — SIGNIFICANT CHANGE UP (ref 130–400)
PMV BLD: 10.7 FL — HIGH (ref 7.4–10.4)
PROT UR-MCNC: SIGNIFICANT CHANGE UP
RBC # BLD: 3.74 M/UL — LOW (ref 4.2–5.4)
RBC # FLD: 15.2 % — HIGH (ref 11.5–14.5)
SP GR SPEC: >1.05 (ref 1.01–1.03)
UROBILINOGEN FLD QL: SIGNIFICANT CHANGE UP
WBC # BLD: 8.29 K/UL — SIGNIFICANT CHANGE UP (ref 4.8–10.8)
WBC # FLD AUTO: 8.29 K/UL — SIGNIFICANT CHANGE UP (ref 4.8–10.8)

## 2023-06-26 PROCEDURE — 71045 X-RAY EXAM CHEST 1 VIEW: CPT | Mod: 26

## 2023-06-26 PROCEDURE — 99232 SBSQ HOSP IP/OBS MODERATE 35: CPT

## 2023-06-26 RX ORDER — BNT162B2 ORIGINAL AND OMICRON BA.4/BA.5 .1125; .1125 MG/2.25ML; MG/2.25ML
0.3 INJECTION, SUSPENSION INTRAMUSCULAR ONCE
Refills: 0 | Status: DISCONTINUED | OUTPATIENT
Start: 2023-06-26 | End: 2023-06-27

## 2023-06-26 RX ADMIN — Medication 650 MILLIGRAM(S): at 12:04

## 2023-06-26 RX ADMIN — Medication 650 MILLIGRAM(S): at 23:05

## 2023-06-26 RX ADMIN — Medication 650 MILLIGRAM(S): at 01:30

## 2023-06-26 RX ADMIN — LATANOPROST 1 DROP(S): 0.05 SOLUTION/ DROPS OPHTHALMIC; TOPICAL at 22:47

## 2023-06-26 RX ADMIN — PANTOPRAZOLE SODIUM 40 MILLIGRAM(S): 20 TABLET, DELAYED RELEASE ORAL at 06:35

## 2023-06-26 RX ADMIN — HEPARIN SODIUM 5000 UNIT(S): 5000 INJECTION INTRAVENOUS; SUBCUTANEOUS at 21:21

## 2023-06-26 RX ADMIN — Medication 650 MILLIGRAM(S): at 12:34

## 2023-06-26 RX ADMIN — HEPARIN SODIUM 5000 UNIT(S): 5000 INJECTION INTRAVENOUS; SUBCUTANEOUS at 05:35

## 2023-06-26 RX ADMIN — Medication 650 MILLIGRAM(S): at 05:35

## 2023-06-26 RX ADMIN — Medication 50 MILLIGRAM(S): at 05:36

## 2023-06-26 RX ADMIN — SENNA PLUS 2 TABLET(S): 8.6 TABLET ORAL at 21:17

## 2023-06-26 RX ADMIN — HYDROMORPHONE HYDROCHLORIDE 0.25 MILLIGRAM(S): 2 INJECTION INTRAMUSCULAR; INTRAVENOUS; SUBCUTANEOUS at 01:45

## 2023-06-26 RX ADMIN — SIMVASTATIN 20 MILLIGRAM(S): 20 TABLET, FILM COATED ORAL at 21:17

## 2023-06-26 RX ADMIN — Medication 100 MICROGRAM(S): at 05:35

## 2023-06-26 RX ADMIN — Medication 1 TABLET(S): at 12:04

## 2023-06-26 RX ADMIN — HEPARIN SODIUM 5000 UNIT(S): 5000 INJECTION INTRAVENOUS; SUBCUTANEOUS at 15:02

## 2023-06-26 RX ADMIN — Medication 650 MILLIGRAM(S): at 00:40

## 2023-06-26 RX ADMIN — LIDOCAINE 1 PATCH: 4 CREAM TOPICAL at 05:36

## 2023-06-26 RX ADMIN — HYDROMORPHONE HYDROCHLORIDE 0.25 MILLIGRAM(S): 2 INJECTION INTRAMUSCULAR; INTRAVENOUS; SUBCUTANEOUS at 01:40

## 2023-06-26 RX ADMIN — Medication 650 MILLIGRAM(S): at 17:26

## 2023-06-26 RX ADMIN — Medication 81 MILLIGRAM(S): at 12:04

## 2023-06-26 RX ADMIN — Medication 50 MILLIGRAM(S): at 17:27

## 2023-06-26 NOTE — PATIENT PROFILE ADULT - FALL HARM RISK - HARM RISK INTERVENTIONS

## 2023-06-26 NOTE — PHYSICAL THERAPY INITIAL EVALUATION ADULT - PATIENT/FAMILY AGREES WITH PLAN
Prescription refill for Spironolactone 25 mg take 1/2 tablet 3 times a week sent under Dr Hui Guy
daughter present/yes

## 2023-06-26 NOTE — PHYSICAL THERAPY INITIAL EVALUATION ADULT - GENERAL OBSERVATIONS, REHAB EVAL
9:30-10:00 pt encountered in bed in NAD, +tele, prima fit removed by RN + O2 via nasal canula, 2L/min , + sling LUE.daughter present.  Pt  had good tolerance for bed mobility, and ambulates with unsteady gait and straight cane, with assistance.  She would benefit from home PT.

## 2023-06-26 NOTE — PHYSICAL THERAPY INITIAL EVALUATION ADULT - PERTINENT HX OF CURRENT PROBLEM, REHAB EVAL
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 she was seen in acute care in the ED and Trauma consult was called. Patient notes she was walking outside and tripped over a crack in the concrete. She fell striking the left side of her body, chest, left face, and left knee. She had no LOC. She notes pain in left arm and shoulder with decreased mobility of that extremity. Patient without additional complaints. Patient had imaging revealing of  Left Humeral neck fracture along with fracture of left 3rd, 4th, 5th, 6ht, 7th, 8th ribs.

## 2023-06-27 ENCOUNTER — TRANSCRIPTION ENCOUNTER (OUTPATIENT)
Age: 88
End: 2023-06-27

## 2023-06-27 VITALS
DIASTOLIC BLOOD PRESSURE: 68 MMHG | OXYGEN SATURATION: 98 % | SYSTOLIC BLOOD PRESSURE: 162 MMHG | HEART RATE: 87 BPM | RESPIRATION RATE: 18 BRPM | TEMPERATURE: 97 F

## 2023-06-27 LAB
ANION GAP SERPL CALC-SCNC: 14 MMOL/L — SIGNIFICANT CHANGE UP (ref 7–14)
BUN SERPL-MCNC: 24 MG/DL — HIGH (ref 10–20)
CALCIUM SERPL-MCNC: 9 MG/DL — SIGNIFICANT CHANGE UP (ref 8.4–10.5)
CHLORIDE SERPL-SCNC: 103 MMOL/L — SIGNIFICANT CHANGE UP (ref 98–110)
CO2 SERPL-SCNC: 21 MMOL/L — SIGNIFICANT CHANGE UP (ref 17–32)
CREAT SERPL-MCNC: 1.1 MG/DL — SIGNIFICANT CHANGE UP (ref 0.7–1.5)
EGFR: 48 ML/MIN/1.73M2 — LOW
GLUCOSE SERPL-MCNC: 164 MG/DL — HIGH (ref 70–99)
MAGNESIUM SERPL-MCNC: 2 MG/DL — SIGNIFICANT CHANGE UP (ref 1.8–2.4)
PHOSPHATE SERPL-MCNC: 4.5 MG/DL — SIGNIFICANT CHANGE UP (ref 2.1–4.9)
POTASSIUM SERPL-MCNC: 4.3 MMOL/L — SIGNIFICANT CHANGE UP (ref 3.5–5)
POTASSIUM SERPL-SCNC: 4.3 MMOL/L — SIGNIFICANT CHANGE UP (ref 3.5–5)
SODIUM SERPL-SCNC: 138 MMOL/L — SIGNIFICANT CHANGE UP (ref 135–146)

## 2023-06-27 PROCEDURE — 99232 SBSQ HOSP IP/OBS MODERATE 35: CPT

## 2023-06-27 RX ORDER — TRAMADOL HYDROCHLORIDE 50 MG/1
1 TABLET ORAL
Qty: 10 | Refills: 0
Start: 2023-06-27

## 2023-06-27 RX ORDER — ACETAMINOPHEN 500 MG
2 TABLET ORAL
Qty: 0 | Refills: 0 | DISCHARGE
Start: 2023-06-27

## 2023-06-27 RX ADMIN — Medication 81 MILLIGRAM(S): at 11:33

## 2023-06-27 RX ADMIN — PANTOPRAZOLE SODIUM 40 MILLIGRAM(S): 20 TABLET, DELAYED RELEASE ORAL at 06:03

## 2023-06-27 RX ADMIN — Medication 100 MICROGRAM(S): at 05:13

## 2023-06-27 RX ADMIN — HYDROMORPHONE HYDROCHLORIDE 0.25 MILLIGRAM(S): 2 INJECTION INTRAMUSCULAR; INTRAVENOUS; SUBCUTANEOUS at 05:43

## 2023-06-27 RX ADMIN — HEPARIN SODIUM 5000 UNIT(S): 5000 INJECTION INTRAVENOUS; SUBCUTANEOUS at 14:23

## 2023-06-27 RX ADMIN — HYDROMORPHONE HYDROCHLORIDE 0.25 MILLIGRAM(S): 2 INJECTION INTRAMUSCULAR; INTRAVENOUS; SUBCUTANEOUS at 06:13

## 2023-06-27 RX ADMIN — Medication 1 TABLET(S): at 11:33

## 2023-06-27 RX ADMIN — Medication 50 MILLIGRAM(S): at 06:03

## 2023-06-27 RX ADMIN — Medication 650 MILLIGRAM(S): at 06:03

## 2023-06-27 RX ADMIN — Medication 650 MILLIGRAM(S): at 11:33

## 2023-06-27 RX ADMIN — Medication 650 MILLIGRAM(S): at 12:14

## 2023-06-27 RX ADMIN — HEPARIN SODIUM 5000 UNIT(S): 5000 INJECTION INTRAVENOUS; SUBCUTANEOUS at 05:15

## 2023-06-27 NOTE — DISCHARGE NOTE NURSING/CASE MANAGEMENT/SOCIAL WORK - PATIENT PORTAL LINK FT
You can access the FollowMyHealth Patient Portal offered by [facility] by registering at the following website: [email or web address]. By joining [facility]’s FollowMyHealth portal, you will also be able to view your health information using other applications (apps) compatible with our system.

## 2023-06-27 NOTE — DISCHARGE NOTE PROVIDER - NSFOLLOWUPCLINICS_GEN_ALL_ED_FT
[facility]  Trauma Surgery  [address] [place] [address]  Phone: [phone]  Fax:   Follow Up Time: 2 weeks

## 2023-06-27 NOTE — DISCHARGE NOTE PROVIDER - HOSPITAL COURSE
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 she was seen in acute care in the ED and Trauma consult was called. Patient notes she was walking outside and tripped over a crack in the concrete. She fell striking the left side of her body, chest, left face, and left knee. She had no LOC. She notes pain in left arm and shoulder with decreased mobility of that extremity. Patient without additional complaints.   She was diagnosed with left humerus fx, and left 3-7th rib fxs  Patient was admitted to the SDU for respiratory monitoring in the setting of advanced age and multiple rib fxs. Orthopedics was consulted for humerus fx. They recommended non weight bearing of the left arm and arm to be placed in a sling.   Patient worked with PT and was recommended to be discharged home with home PT.     She is eating, voiding, ambulating, and pain is controlled on medications.

## 2023-06-27 NOTE — PROGRESS NOTE ADULT - SUBJECTIVE AND OBJECTIVE BOX
GENERAL SURGERY PROGRESS NOTE      Events of past 24 hours:  ALENA since initial consult eval        ROS otherwise negative except per subjective and HPI      Vital Signs Last 24 Hrs  T(C): 36.9 (25 Jun 2023 21:40), Max: 37.6 (25 Jun 2023 15:53)  T(F): 98.5 (25 Jun 2023 21:40), Max: 99.6 (25 Jun 2023 15:53)  HR: 86 (25 Jun 2023 21:40) (77 - 104)  BP: 135/58 (25 Jun 2023 21:40) (119/80 - 149/78)  BP(mean): 75 (25 Jun 2023 21:40) (75 - 84)  RR: 18 (25 Jun 2023 21:15) (18 - 18)  SpO2: 95% (25 Jun 2023 21:40) (91% - 100%)    Parameters below as of 25 Jun 2023 21:40  Patient On (Oxygen Delivery Method): nasal cannula  O2 Flow (L/min): 2      Diet, Regular (06-26-23 @ 00:37) [Active]          I&O's Detail        MEDICATIONS:   MEDICATIONS  (STANDING):  acetaminophen     Tablet .. 650 milliGRAM(s) Oral every 6 hours  aspirin enteric coated 81 milliGRAM(s) Oral daily  heparin   Injectable 5000 Unit(s) SubCutaneous every 8 hours  latanoprost 0.005% Ophthalmic Solution 1 Drop(s) Both EYES at bedtime  levothyroxine 100 MICROGram(s) Oral daily  metoprolol tartrate 50 milliGRAM(s) Oral two times a day  multivitamin 1 Tablet(s) Oral daily  pantoprazole    Tablet 40 milliGRAM(s) Oral before breakfast  senna 2 Tablet(s) Oral at bedtime  simvastatin 20 milliGRAM(s) Oral at bedtime    MEDICATIONS  (PRN):  HYDROmorphone  Injectable 0.25 milliGRAM(s) IV Push every 4 hours PRN breakthrough pain  oxyCODONE    IR 5 milliGRAM(s) Oral every 6 hours PRN Severe Pain (7 - 10)        LAB/STUDIES:                        12.5   9.10  )-----------( 218      ( 25 Jun 2023 20:45 )             37.6     06-25    139  |  104  |  19  ----------------------------<  121<H>  4.9   |  19  |  1.0    Ca    9.2      25 Jun 2023 20:45  Phos  4.0     06-25  Mg     1.8     06-25    TPro  7.5  /  Alb  4.6  /  TBili  0.8  /  DBili  x   /  AST  25  /  ALT  22  /  AlkPhos  70  06-25    PT/INR - ( 25 Jun 2023 10:41 )   PT: 10.30 sec;   INR: 0.91 ratio         PTT - ( 25 Jun 2023 10:41 )  PTT:28.1 sec  LIVER FUNCTIONS - ( 25 Jun 2023 10:41 )  Alb: 4.6 g/dL / Pro: 7.5 g/dL / ALK PHOS: 70 U/L / ALT: 22 U/L / AST: 25 U/L / GGT: x           Urinalysis Basic - ( 26 Jun 2023 00:25 )    Color: Yellow / Appearance: Clear / SG: >1.050 / pH: x  Gluc: x / Ketone: Negative  / Bili: Negative / Urobili: <2 mg/dL   Blood: x / Protein: Trace / Nitrite: Negative   Leuk Esterase: Negative / RBC: x / WBC x   Sq Epi: x / Non Sq Epi: x / Bacteria: x              IMAGING:    
GENERAL SURGERY PROGRESS NOTE    Patient: STACIE DENIS , 89y ([birth year])Female   MRN: [id number]  Location: [address]  Visit: 06-25-23 Inpatient  Date: 06-27-23 @ 07:36      Procedure/Dx/Injuries: Humeral neck fracture along with fracture of left 3rd, 4th, 5th, 6ht, 7th, 8th ribs.     Events of past 24 hours: DG to floor, ambulated w PT    PAST MEDICAL & SURGICAL HISTORY:      Vitals:   T(F): 96.4 (06-27-23 @ 04:51), Max: 97.6 (06-26-23 @ 12:00)  HR: 73 (06-27-23 @ 04:51)  BP: 137/58 (06-27-23 @ 04:51)  RR: 18 (06-27-23 @ 04:51)  SpO2: 100% (06-27-23 @ 04:51)      Diet, Regular      Fluids:     I & O's:    06-26-23 @ 07:01  -  06-27-23 @ 07:00  --------------------------------------------------------  IN:    Oral Fluid: 630 mL  Total IN: 630 mL    OUT:    Voided (mL): 750 mL  Total OUT: 750 mL    Total NET: -120 mL          PHYSICAL EXAM:  PHYSICAL EXAM:  GENERAL: No acute distress, well-developed  CHEST/LUNG: CTAB; No wheezes, rales, or rhonchi  HEART: Regular rate and rhythm.   ABDOMEN: Soft, non-tender, non distended  EXTREMITIES:  2+ peripheral pulses b/l, No clubbing, cyanosis, or edema, LUE w sling in place  NEUROLOGY: A&O x 3, no focal deficits  SKIN: No rashes or lesions    MEDICATIONS  (STANDING):  acetaminophen     Tablet .. 650 milliGRAM(s) Oral every 6 hours  aspirin enteric coated 81 milliGRAM(s) Oral daily  coronavirus bivalent (EUA) Booster Vaccine (PFIZER) 0.3 milliLiter(s) IntraMuscular once  heparin   Injectable 5000 Unit(s) SubCutaneous every 8 hours  latanoprost 0.005% Ophthalmic Solution 1 Drop(s) Both EYES at bedtime  levothyroxine 100 MICROGram(s) Oral daily  metoprolol tartrate 50 milliGRAM(s) Oral two times a day  multivitamin 1 Tablet(s) Oral daily  pantoprazole    Tablet 40 milliGRAM(s) Oral before breakfast  senna 2 Tablet(s) Oral at bedtime  simvastatin 20 milliGRAM(s) Oral at bedtime    MEDICATIONS  (PRN):  HYDROmorphone  Injectable 0.25 milliGRAM(s) IV Push every 4 hours PRN breakthrough pain  oxyCODONE    IR 5 milliGRAM(s) Oral every 6 hours PRN Severe Pain (7 - 10)      DVT PROPHYLAXIS: heparin   Injectable 5000 Unit(s) SubCutaneous every 8 hours    GI PROPHYLAXIS: pantoprazole    Tablet 40 milliGRAM(s) Oral before breakfast    ANTICOAGULATION:   ANTIBIOTICS:            LAB/STUDIES:  Labs:  CAPILLARY BLOOD GLUCOSE                              11.1   8.29  )-----------( 213      ( 26 Jun 2023 21:06 )             34.3       Auto Neutrophil %: 64.7 % (06-26-23 @ 21:06)  Auto Immature Granulocyte %: 0.2 % (06-26-23 @ 21:06)    06-26    138  |  103  |  24<H>  ----------------------------<  164<H>  4.3   |  21  |  1.1      Calcium: 9.0 mg/dL (06-26-23 @ 21:06)      LFTs:             7.5  | 0.8  | 25       ------------------[70      ( 25 Jun 2023 10:41 )  4.6  | x    | 22          Lipase:49     Amylase:x         Lactate, Blood: 2.0 mmol/L (06-25-23 @ 10:41)      Coags:     10.30  ----< 0.91    ( 25 Jun 2023 10:41 )     28.1                Urinalysis Basic - ( 26 Jun 2023 21:06 )    Color: x / Appearance: x / SG: x / pH: x  Gluc: 164 mg/dL / Ketone: x  / Bili: x / Urobili: x   Blood: x / Protein: x / Nitrite: x   Leuk Esterase: x / RBC: x / WBC x   Sq Epi: x / Non Sq Epi: x / Bacteria: x                IMAGING:      ACCESS/ DEVICES:  [x ] Peripheral IV  [ ] Central Venous Line	[ ] R	[ ] L	[ ] IJ	[ ] Fem	[ ] SC	Placed:   [ ] Arterial Line		[ ] R	[ ] L	[ ] Fem	[ ] Rad	[ ] Ax	Placed:   [ ] PICC:					[ ] Mediport  [ ] Urinary Catheter,  Date Placed:   [ ] Chest tube: [ ] Right, [ ] Left  [ ] LISA/Vineet Drains

## 2023-06-27 NOTE — DISCHARGE NOTE PROVIDER - CARE PROVIDER_API CALL
Christian Walker [facility]  Orthopaedic Surgery  [address] [place] [address]  Phone: [phone]  Fax: [phone]  Follow Up Time: 2 weeks

## 2023-06-27 NOTE — DISCHARGE NOTE PROVIDER - NSDCMRMEDTOKEN_GEN_ALL_CORE_FT
acetaminophen 325 mg oral tablet: 2 tab(s) orally every 6 hours  amLODIPine 10 mg oral tablet: 1 orally once a day  Aspir 81 oral delayed release tablet: 1 orally once a day  latanoprost 0.005% ophthalmic solution: 1 in each eye once a day  Levothroid 100 mcg (0.1 mg) oral tablet: 1 orally once a day  metoprolol tartrate 50 mg oral tablet: 1 orally 2 times a day  Multiple Vitamins oral tablet: 1 orally once a day  omeprazole 20 mg oral delayed release tablet: 1 orally once a day  simvastatin 20 mg oral tablet: 1 orally once a day  timolol maleate 0.25% ophthalmic solution: 1 in each affected eye once a day  traMADol 50 mg oral tablet: 1 tab(s) orally every 6 hours as needed for  severe pain MDD: 4

## 2023-06-27 NOTE — DISCHARGE NOTE PROVIDER - NSDCCPCAREPLAN_GEN_ALL_CORE_FT
PRINCIPAL DISCHARGE DIAGNOSIS  Diagnosis: Multiple fractures of ribs of left side  Assessment and Plan of Treatment: You suffered a fall and were found to have fractures of you rleft 3-7 ribs. You were admitted to the hospital for pain control and physical therapy.   You should continue to take all home medications as previously prescribed. If you are in pain- you may take over the counter Tylenol every 4-6 hours as needed.   Pain medication called Tramadol was sent to [facility] pharmacy for sevre pain only as needed.   Continue to use incentive spirometer 10x per hour for lung expansion.  You may follow up in thr trauma clinic in 2 weeks. Please also see your primary care doctor upon discharge.   If you experience severe pain, nausea, vomiting, shortness of breath, dizziness- call office or report to the nearest Emergency Department.      SECONDARY DISCHARGE DIAGNOSES  Diagnosis: Humeral fracture  Assessment and Plan of Treatment: Keep left arm in sling and do not bear any weight on the arm until seen in the office by orthopedic doctor. Please call office to schedule appointment.    Diagnosis: Fall  Assessment and Plan of Treatment:

## 2023-06-27 NOTE — PROGRESS NOTE ADULT - ASSESSMENT
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 she was seen in acute care in the ED and Trauma consult was called. Patient notes she was walking outside and tripped over a crack in the concrete. She fell striking the left side of her body, chest, left face, and left knee. She had no LOC. She notes pain in left arm and shoulder with decreased mobility of that extremity. Patient without additional complaints. Patient had imaging revealing of  Left Humeral neck fracture along with fracture of left 3rd, 4th, 5th, 6ht, 7th, 8th ribs.     S/P Fall with multiple rib fractures of left chest and left Humeral neck fracture  - Incentive Spirometer use encouraged   - LUE sling  - Pain control, Tylenol Ibuprofen/ Oxycontin for pain  - Localized Lidocaine patches  - Orthopedics Consult : no acute surgical intervention, patient to follow up outpatient  - PT/OT: home  - Continue home medications     spectra [id number]
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 s/p fall at home. she was seen in acute care in the ED and Trauma consult was called. Patient notes she was walking outside and tripped over a crack in the concrete. She fell striking the left side of her body, chest, left face, and left knee. She had no LOC. She notes pain in left arm and shoulder with decreased mobility of that extremity. Patient without additional complaints. Patient had imaging revealing of  Left Humeral neck fracture along with fracture of left 3rd, 4th, 5th, 6ht, 7th, 8th ribs.     S/P Fall with multiple rib fractures of left chest and left Humeral neck fracture  - Incentive Spirometer use encouraged   - Pain control, Tylenol Ibuprofen/ Oxycontin for pain  - Localized Lidocaine patches  - Orthopedics Consult    - PT/OT/Hospitalist consult  - Continue home medications

## 2023-06-28 ENCOUNTER — APPOINTMENT (OUTPATIENT)
Dept: ORTHOPEDIC SURGERY | Facility: CLINIC | Age: 88
End: 2023-06-28
Payer: MEDICARE

## 2023-06-28 VITALS — BODY MASS INDEX: 27.94 KG/M2 | WEIGHT: 148 LBS | HEIGHT: 61 IN

## 2023-06-28 PROBLEM — Z00.00 ENCOUNTER FOR PREVENTIVE HEALTH EXAMINATION: Status: ACTIVE | Noted: 2023-06-28

## 2023-06-28 PROCEDURE — 99203 OFFICE O/P NEW LOW 30 MIN: CPT

## 2023-07-02 DIAGNOSIS — R91.8 OTHER NONSPECIFIC ABNORMAL FINDING OF LUNG FIELD: ICD-10-CM

## 2023-07-02 DIAGNOSIS — Z88.0 ALLERGY STATUS TO PENICILLIN: ICD-10-CM

## 2023-07-02 DIAGNOSIS — M25.512 PAIN IN LEFT SHOULDER: ICD-10-CM
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
## 2023-07-02 DIAGNOSIS — S42.202A UNSPECIFIED FRACTURE OF UPPER END OF LEFT HUMERUS, INITIAL ENCOUNTER FOR CLOSED FRACTURE: ICD-10-CM

## 2023-07-02 DIAGNOSIS — Z79.82 LONG TERM (CURRENT) USE OF ASPIRIN: ICD-10-CM
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
## 2023-07-02 DIAGNOSIS — E27.8 OTHER SPECIFIED DISORDERS OF ADRENAL GLAND: ICD-10-CM

## 2023-07-02 DIAGNOSIS — Z88.2 ALLERGY STATUS TO SULFONAMIDES: ICD-10-CM

## 2023-07-02 DIAGNOSIS — R40.2362 COMA SCALE, BEST MOTOR RESPONSE, OBEYS COMMANDS, AT ARRIVAL TO EMERGENCY DEPARTMENT: ICD-10-CM

## 2023-07-02 DIAGNOSIS — E03.9 HYPOTHYROIDISM, UNSPECIFIED: ICD-10-CM

## 2023-07-02 DIAGNOSIS — Z90.5 ACQUIRED ABSENCE OF KIDNEY: ICD-10-CM

## 2023-07-02 DIAGNOSIS — Z95.5 PRESENCE OF CORONARY ANGIOPLASTY IMPLANT AND GRAFT: ICD-10-CM

## 2023-07-02 DIAGNOSIS — S06.9X0A UNSPECIFIED INTRACRANIAL INJURY WITHOUT LOSS OF CONSCIOUSNESS, INITIAL ENCOUNTER: ICD-10-CM

## 2023-07-02 DIAGNOSIS — I10 ESSENTIAL (PRIMARY) HYPERTENSION: ICD-10-CM
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
## 2023-07-02 DIAGNOSIS — S22.42XA MULTIPLE FRACTURES OF RIBS, LEFT SIDE, INITIAL ENCOUNTER FOR CLOSED FRACTURE: ICD-10-CM

## 2023-07-02 DIAGNOSIS — K21.9 GASTRO-ESOPHAGEAL REFLUX DISEASE WITHOUT ESOPHAGITIS: ICD-10-CM

## 2023-07-02 DIAGNOSIS — E78.5 HYPERLIPIDEMIA, UNSPECIFIED: ICD-10-CM

## 2023-07-02 DIAGNOSIS — R40.2142 COMA SCALE, EYES OPEN, SPONTANEOUS, AT ARRIVAL TO EMERGENCY DEPARTMENT: ICD-10-CM

## 2023-07-02 DIAGNOSIS — I25.10 ATHEROSCLEROTIC HEART DISEASE OF NATIVE CORONARY ARTERY WITHOUT ANGINA PECTORIS: ICD-10-CM

## 2023-07-02 DIAGNOSIS — E04.1 NONTOXIC SINGLE THYROID NODULE: ICD-10-CM

## 2023-07-02 DIAGNOSIS — R40.2252 COMA SCALE, BEST VERBAL RESPONSE, ORIENTED, AT ARRIVAL TO EMERGENCY DEPARTMENT: ICD-10-CM

## 2023-07-12 ENCOUNTER — APPOINTMENT (OUTPATIENT)
Dept: ORTHOPEDIC SURGERY | Facility: CLINIC | Age: 88
End: 2023-07-12
Payer: MEDICARE

## 2023-07-12 DIAGNOSIS — S42.202A UNSPECIFIED FRACTURE OF UPPER END OF LEFT HUMERUS, INITIAL ENCOUNTER FOR CLOSED FRACTURE: ICD-10-CM

## 2023-07-12 DIAGNOSIS — M85.89 OTHER SPECIFIED DISORDERS OF BONE DENSITY AND STRUCTURE, MULTIPLE SITES: ICD-10-CM

## 2023-07-12 PROCEDURE — 73030 X-RAY EXAM OF SHOULDER: CPT | Mod: LT

## 2023-07-12 PROCEDURE — 99214 OFFICE O/P EST MOD 30 MIN: CPT | Mod: 25

## 2023-07-12 PROCEDURE — 23600 CLTX PROX HUMRL FX W/O MNPJ: CPT | Mod: LT

## 2023-08-02 ENCOUNTER — OUTPATIENT (OUTPATIENT)
Dept: OUTPATIENT SERVICES | Facility: HOSPITAL | Age: 88
LOS: 1 days | End: 2023-08-02
Payer: MEDICARE

## 2023-08-02 DIAGNOSIS — M25.512 PAIN IN LEFT SHOULDER: ICD-10-CM

## 2023-08-02 PROCEDURE — 97165 OT EVAL LOW COMPLEX 30 MIN: CPT | Mod: GO

## 2023-08-02 PROCEDURE — 97110 THERAPEUTIC EXERCISES: CPT | Mod: GO

## 2023-08-03 DIAGNOSIS — M25.512 PAIN IN LEFT SHOULDER: ICD-10-CM

## 2023-08-07 ENCOUNTER — OUTPATIENT (OUTPATIENT)
Dept: OUTPATIENT SERVICES | Facility: HOSPITAL | Age: 88
LOS: 1 days | End: 2023-08-07

## 2023-08-07 DIAGNOSIS — M25.512 PAIN IN LEFT SHOULDER: ICD-10-CM

## 2023-08-10 ENCOUNTER — OUTPATIENT (OUTPATIENT)
Dept: OUTPATIENT SERVICES | Facility: HOSPITAL | Age: 88
LOS: 1 days | End: 2023-08-10

## 2023-08-10 DIAGNOSIS — M25.512 PAIN IN LEFT SHOULDER: ICD-10-CM

## 2023-08-14 ENCOUNTER — APPOINTMENT (OUTPATIENT)
Dept: ORTHOPEDIC SURGERY | Facility: CLINIC | Age: 88
End: 2023-08-14
Payer: MEDICARE

## 2023-08-14 PROCEDURE — 99024 POSTOP FOLLOW-UP VISIT: CPT

## 2023-08-14 PROCEDURE — 73030 X-RAY EXAM OF SHOULDER: CPT | Mod: LT

## 2023-08-17 ENCOUNTER — OUTPATIENT (OUTPATIENT)
Dept: OUTPATIENT SERVICES | Facility: HOSPITAL | Age: 88
LOS: 1 days | End: 2023-08-17

## 2023-08-17 DIAGNOSIS — M25.512 PAIN IN LEFT SHOULDER: ICD-10-CM

## 2023-08-21 ENCOUNTER — OUTPATIENT (OUTPATIENT)
Dept: OUTPATIENT SERVICES | Facility: HOSPITAL | Age: 88
LOS: 1 days | End: 2023-08-21

## 2023-08-21 DIAGNOSIS — M25.512 PAIN IN LEFT SHOULDER: ICD-10-CM

## 2023-08-24 ENCOUNTER — OUTPATIENT (OUTPATIENT)
Dept: OUTPATIENT SERVICES | Facility: HOSPITAL | Age: 88
LOS: 1 days | End: 2023-08-24

## 2023-08-24 DIAGNOSIS — M25.512 PAIN IN LEFT SHOULDER: ICD-10-CM

## 2023-08-28 ENCOUNTER — OUTPATIENT (OUTPATIENT)
Dept: OUTPATIENT SERVICES | Facility: HOSPITAL | Age: 88
LOS: 1 days | End: 2023-08-28

## 2023-08-28 DIAGNOSIS — M25.512 PAIN IN LEFT SHOULDER: ICD-10-CM

## 2023-08-31 ENCOUNTER — OUTPATIENT (OUTPATIENT)
Dept: OUTPATIENT SERVICES | Facility: HOSPITAL | Age: 88
LOS: 1 days | End: 2023-08-31

## 2023-08-31 DIAGNOSIS — M25.512 PAIN IN LEFT SHOULDER: ICD-10-CM

## 2023-09-07 ENCOUNTER — OUTPATIENT (OUTPATIENT)
Dept: OUTPATIENT SERVICES | Facility: HOSPITAL | Age: 88
LOS: 1 days | End: 2023-09-07
Payer: MEDICARE

## 2023-09-07 DIAGNOSIS — M25.512 PAIN IN LEFT SHOULDER: ICD-10-CM

## 2023-09-07 PROCEDURE — 97110 THERAPEUTIC EXERCISES: CPT | Mod: GO

## 2023-09-08 DIAGNOSIS — M25.512 PAIN IN LEFT SHOULDER: ICD-10-CM

## 2023-09-11 ENCOUNTER — OUTPATIENT (OUTPATIENT)
Dept: OUTPATIENT SERVICES | Facility: HOSPITAL | Age: 88
LOS: 1 days | End: 2023-09-11

## 2023-09-11 DIAGNOSIS — M25.512 PAIN IN LEFT SHOULDER: ICD-10-CM

## 2023-09-14 ENCOUNTER — OUTPATIENT (OUTPATIENT)
Dept: OUTPATIENT SERVICES | Facility: HOSPITAL | Age: 88
LOS: 1 days | End: 2023-09-14

## 2023-09-14 DIAGNOSIS — M25.512 PAIN IN LEFT SHOULDER: ICD-10-CM

## 2023-09-18 ENCOUNTER — OUTPATIENT (OUTPATIENT)
Dept: OUTPATIENT SERVICES | Facility: HOSPITAL | Age: 88
LOS: 1 days | End: 2023-09-18

## 2023-09-18 DIAGNOSIS — M25.512 PAIN IN LEFT SHOULDER: ICD-10-CM

## 2023-09-21 ENCOUNTER — OUTPATIENT (OUTPATIENT)
Dept: OUTPATIENT SERVICES | Facility: HOSPITAL | Age: 88
LOS: 1 days | End: 2023-09-21

## 2023-09-21 DIAGNOSIS — M25.512 PAIN IN LEFT SHOULDER: ICD-10-CM

## 2023-09-25 ENCOUNTER — OUTPATIENT (OUTPATIENT)
Dept: OUTPATIENT SERVICES | Facility: HOSPITAL | Age: 88
LOS: 1 days | End: 2023-09-25

## 2023-09-25 DIAGNOSIS — M25.512 PAIN IN LEFT SHOULDER: ICD-10-CM

## 2023-09-28 ENCOUNTER — OUTPATIENT (OUTPATIENT)
Dept: OUTPATIENT SERVICES | Facility: HOSPITAL | Age: 88
LOS: 1 days | End: 2023-09-28

## 2023-09-28 DIAGNOSIS — M25.512 PAIN IN LEFT SHOULDER: ICD-10-CM

## 2023-10-02 ENCOUNTER — OUTPATIENT (OUTPATIENT)
Dept: OUTPATIENT SERVICES | Facility: HOSPITAL | Age: 88
LOS: 1 days | End: 2023-10-02
Payer: MEDICARE

## 2023-10-02 DIAGNOSIS — M25.512 PAIN IN LEFT SHOULDER: ICD-10-CM

## 2023-10-02 PROCEDURE — 97110 THERAPEUTIC EXERCISES: CPT | Mod: GO

## 2023-10-03 DIAGNOSIS — M25.512 PAIN IN LEFT SHOULDER: ICD-10-CM

## 2023-10-05 ENCOUNTER — OUTPATIENT (OUTPATIENT)
Dept: OUTPATIENT SERVICES | Facility: HOSPITAL | Age: 88
LOS: 1 days | End: 2023-10-05

## 2023-10-05 DIAGNOSIS — M25.512 PAIN IN LEFT SHOULDER: ICD-10-CM

## 2024-04-01 NOTE — DISCUSSION/SUMMARY
[de-identified] : THIS CHARMING RIGHT-HAND-DOMINANT WOMAN IS HERE WITH HER DAUGHTER.  SHE WAS MUCH YOUNGER THAN HER STATED AGE AND IS QUITE ACTIVE.  SHE HAS A LEFT PROXIMAL HUMERUS FRACTURE WHICH REMAINS OPPOSED ENOUGH TO EXPECT IT TO HEAL.  WE SPENT SOME TIME SHOWING HER PENDULUM EXERCISES AND WE ARE TO SEND HER TO THERAPY AND ALSO DIFFERENT WAYS TO STAY COMFORTABLE WITH SLINGS AND A SHIRT ETC. WE WILL GET A CHECK ON HER IN ABOUT 1 MONTH WITH A NEW X-RAY AND HAVE HER GO TO PHYSICAL THERAPY IN THE MEANTIME  The x-ray taken today in our office 2 views of the left shoulder show adequate apposition and angulation and displacement of her proximal humerus fracture  She is already taking calcium and vitamin D but we did discuss further OSTEOPENIA We discussed with them the maintenance of bone health through weightbearing activities and general health measures such as smoking cessation, diabetic control and proper weight maintenance.  We did advocate that they take vitamin D 1 to 5000 units a day and calcium citrate 500 mg a day.  This can be obtained over-the-counter.  We stressed that they should take calcium citrate not calcium carbonate which is more like carbon/chalk and is not as well absorbed.  We encourage them to speak to their primary care physician as regards the issue of whether or not they should get a bone density test and possibly be on adjunct of treatment such as Prolia, Fosamax etc.   We discussed with them that a proximal humerus fracture always leads to some decreased range of motion in the shoulder due to capsular scarring and contracture.  We explained that that is regardless of surgery.  We explained that the surgery is primarily to make sure that there is adequate apposition to expect healing, but that the surgery and early range of motion does not necessarily yield a better long-term range of motion.  We explained to them the need to keep their arm against their side either with a sling or holding their arm like [place] or wrapping a stiff shirt or sweatshirt or sweater around them that closes in the front and not including the affected upper extremity in the sleeve.  We also explained to them pendulum exercises and demonstrated them.

## 2024-04-01 NOTE — HISTORY OF PRESENT ILLNESS
[de-identified] : 89-year-old female here for evaluation of injury sustained to the left shoulder, patient states that on June 25, 2023 she fell down after she tripped landing on her left shoulder, as a result of these injury she fractured ribs and the left shoulder. Patient was seen at [facility] and advised to follow-up with orthopedic. Patient states that is not pain if she is using the sling. Patient stated the pain is 8/10 with any movement of the arm.  Patient admits to hypertension, hyperlipidemia and hypothyroidism. Allergies to penicillin and Valium.

## 2024-04-01 NOTE — DISCUSSION/SUMMARY
[de-identified] : Impression: Left nondisplaced fracture of the proximal humeral Plan: Patient was advised to continue with immobilization, she was placed in a shoulder immobilizer. Instruction on how to use the immobilizer were given. Patient was advised for gentle range of motion to the digits, wrist and elbow. Patient was advised for gentle massage of the soft tissue to help alleviate the soft tissue swelling. Patient was advised to continue with Tylenol and tramadol for breakthrough pain, she could also add some NSAIDs if needed. They will give me a call if they need any refill on tramadol. Follow-up: 2 to 3 weeks for repeat evaluation and repeat x-rays with Dr. Bauman, Dr. Meyer or myself.

## 2024-04-01 NOTE — IMAGING
Detail Level: Zone [de-identified] : On examination of the left upper extremity, patient has mild swelling, there is ecchymosis over the upper arm and over the forearm.  Patient has good range of motion to the elbow, wrist and digits.\par Neurovascular intact.\par \par 3 of the left shoulder was done as the MRI images for\par \par In office today, this x-ray shows a impacted nondisplaced oblique fracture over the proximal humerus on about the surgical neck.

## 2024-10-29 ENCOUNTER — NON-APPOINTMENT (OUTPATIENT)
Age: 89
End: 2024-10-29